# Patient Record
Sex: FEMALE | Race: WHITE | NOT HISPANIC OR LATINO | Employment: OTHER | URBAN - METROPOLITAN AREA
[De-identification: names, ages, dates, MRNs, and addresses within clinical notes are randomized per-mention and may not be internally consistent; named-entity substitution may affect disease eponyms.]

---

## 2018-09-24 ENCOUNTER — TRANSCRIBE ORDERS (OUTPATIENT)
Dept: ADMINISTRATIVE | Facility: HOSPITAL | Age: 63
End: 2018-09-24

## 2018-09-24 DIAGNOSIS — K74.69 OTHER CIRRHOSIS OF LIVER (HCC): Primary | ICD-10-CM

## 2018-10-04 ENCOUNTER — HOSPITAL ENCOUNTER (OUTPATIENT)
Dept: CT IMAGING | Facility: HOSPITAL | Age: 63
Discharge: HOME | End: 2018-10-04
Admitting: INTERNAL MEDICINE

## 2018-10-04 VITALS
BODY MASS INDEX: 25.74 KG/M2 | OXYGEN SATURATION: 95 % | HEIGHT: 67 IN | RESPIRATION RATE: 18 BRPM | SYSTOLIC BLOOD PRESSURE: 115 MMHG | TEMPERATURE: 98.3 F | WEIGHT: 164 LBS | DIASTOLIC BLOOD PRESSURE: 73 MMHG | HEART RATE: 77 BPM

## 2018-10-04 DIAGNOSIS — K74.69 OTHER CIRRHOSIS OF LIVER (HCC): ICD-10-CM

## 2018-10-04 LAB
APTT PPP: 24.8 SECONDS (ref 24–31)
GLUCOSE BLDC GLUCOMTR-MCNC: 155 MG/DL (ref 70–130)
INR PPP: 1.13 (ref 0.91–1.09)
PLATELET # BLD AUTO: 92 10*3/MM3 (ref 150–450)
PROTHROMBIN TIME: 11.9 SECONDS (ref 9.6–11.5)

## 2018-10-04 PROCEDURE — 85049 AUTOMATED PLATELET COUNT: CPT | Performed by: INTERNAL MEDICINE

## 2018-10-04 PROCEDURE — 88307 TISSUE EXAM BY PATHOLOGIST: CPT | Performed by: INTERNAL MEDICINE

## 2018-10-04 PROCEDURE — 85730 THROMBOPLASTIN TIME PARTIAL: CPT | Performed by: INTERNAL MEDICINE

## 2018-10-04 PROCEDURE — 85610 PROTHROMBIN TIME: CPT | Performed by: INTERNAL MEDICINE

## 2018-10-04 PROCEDURE — 77012 CT SCAN FOR NEEDLE BIOPSY: CPT

## 2018-10-04 PROCEDURE — 82962 GLUCOSE BLOOD TEST: CPT

## 2018-10-04 PROCEDURE — 88313 SPECIAL STAINS GROUP 2: CPT | Performed by: INTERNAL MEDICINE

## 2018-10-04 RX ORDER — LIDOCAINE HYDROCHLORIDE 10 MG/ML
20 INJECTION, SOLUTION INFILTRATION; PERINEURAL ONCE
Status: COMPLETED | OUTPATIENT
Start: 2018-10-04 | End: 2018-10-04

## 2018-10-04 RX ORDER — CILOSTAZOL 50 MG/1
50 TABLET ORAL
COMMUNITY

## 2018-10-04 RX ORDER — SODIUM CHLORIDE 0.9 % (FLUSH) 0.9 %
3 SYRINGE (ML) INJECTION EVERY 12 HOURS SCHEDULED
Status: DISCONTINUED | OUTPATIENT
Start: 2018-10-04 | End: 2018-10-05 | Stop reason: HOSPADM

## 2018-10-04 RX ORDER — FERROUS SULFATE TAB EC 324 MG (65 MG FE EQUIVALENT) 324 (65 FE) MG
324 TABLET DELAYED RESPONSE ORAL
COMMUNITY

## 2018-10-04 RX ORDER — METOPROLOL SUCCINATE 200 MG/1
50 TABLET, EXTENDED RELEASE ORAL DAILY
COMMUNITY

## 2018-10-04 RX ORDER — ATORVASTATIN CALCIUM 80 MG/1
40 TABLET, FILM COATED ORAL DAILY
COMMUNITY

## 2018-10-04 RX ORDER — CLOPIDOGREL BISULFATE 75 MG/1
75 TABLET ORAL
COMMUNITY

## 2018-10-04 RX ORDER — ESCITALOPRAM OXALATE 10 MG/1
10 TABLET ORAL
COMMUNITY

## 2018-10-04 RX ORDER — COLESEVELAM 180 1/1
625 TABLET ORAL 2 TIMES DAILY
COMMUNITY

## 2018-10-04 RX ORDER — SODIUM CHLORIDE 0.9 % (FLUSH) 0.9 %
3-10 SYRINGE (ML) INJECTION AS NEEDED
Status: DISCONTINUED | OUTPATIENT
Start: 2018-10-04 | End: 2018-10-05 | Stop reason: HOSPADM

## 2018-10-04 RX ORDER — LOPERAMIDE HYDROCHLORIDE 2 MG/1
2 CAPSULE ORAL
COMMUNITY

## 2018-10-04 RX ORDER — PANTOPRAZOLE SODIUM 40 MG/1
40 TABLET, DELAYED RELEASE ORAL
COMMUNITY
Start: 2018-04-04

## 2018-10-04 RX ORDER — DILTIAZEM HYDROCHLORIDE 120 MG/1
120 CAPSULE, COATED, EXTENDED RELEASE ORAL
COMMUNITY
Start: 2018-04-04

## 2018-10-04 RX ADMIN — LIDOCAINE HYDROCHLORIDE 20 ML: 10 INJECTION, SOLUTION INFILTRATION; PERINEURAL at 11:45

## 2018-10-04 NOTE — NURSING NOTE
Given printed and oral discharge instructions. Verbalizes understanding. Discharged per wheelchair to front entrance with daughter driving.

## 2018-10-04 NOTE — DISCHARGE INSTR - ACTIVITY
Rest quietly at home today.  You may resume light activity tomorrow as tolerated.  You may shower and remove the bandage tomorrow.  No tub baths or swimming for 5 days.

## 2018-10-04 NOTE — POST-PROCEDURE NOTE
Radiology Procedure    Pre-procedure: Informed written consent was obtained prior to beginning.  We discussed the procedure in detail and major risks of bleeding, infection, needle damage to bowel or other organs, and how bleeding is managed, if it occurs.  She agrees to proceed with CT guided random liver biopsy.       Procedure Performed: CT guided random right lobe liver biopsy.     IV Sedation and/or Anesthesia:  No    Complications: None.    Preliminary Findings: 3 intact pinkish 18 gauge cores in formalin to lab    Specimen Removed: As above.    Estimated Blood Loss:  0.1  ml    Post-Procedure Diagnosis: Non-alcoholic cirrhosis    Post-Procedure Plan: She has no c/o.  Observe in room about 2 1/2 hours and d/c.      Standard Discharge Instructions Given:yes     Andrés Jane MD  10/04/18  2:39 PM

## 2018-10-04 NOTE — NURSING NOTE
Procedure complete.  Pt tolerated well.  Report called to CAROL.  Pt returned to room via hospital transport.

## 2018-10-04 NOTE — NURSING NOTE
Returned to room. Tolerated procedure well. Folded 4x4 dressing lateral RUQ clean, dry, and intact. No complaints offered. HOB elevated, provided lunch tray.

## 2018-10-05 ENCOUNTER — TELEPHONE (OUTPATIENT)
Dept: INFUSION THERAPY | Facility: HOSPITAL | Age: 63
End: 2018-10-05

## 2018-10-05 LAB
CYTO UR: NORMAL
LAB AP CASE REPORT: NORMAL
LAB AP CLINICAL INFORMATION: NORMAL
PATH REPORT.FINAL DX SPEC: NORMAL
PATH REPORT.GROSS SPEC: NORMAL

## 2019-12-18 ENCOUNTER — DOCUMENTATION (OUTPATIENT)
Dept: ONCOLOGY | Facility: CLINIC | Age: 64
End: 2019-12-18

## 2019-12-18 NOTE — PROGRESS NOTES
[facility] called and said that path slides were ready for pickup. I called [facility] and spoke with Omid to let them know that path slides were ready for pickup. SHREE

## 2019-12-18 NOTE — PROGRESS NOTES
Sent fax request to [facility] and to [facility] for request of path slides and disc per Dr. Smith's request. AG

## 2019-12-23 ENCOUNTER — LAB REQUISITION (OUTPATIENT)
Dept: LAB | Facility: HOSPITAL | Age: 64
End: 2019-12-23

## 2019-12-23 DIAGNOSIS — K63.9 DISEASE OF INTESTINE, UNSPECIFIED: ICD-10-CM

## 2019-12-23 DIAGNOSIS — Z91.81 HISTORY OF FALLING: ICD-10-CM

## 2019-12-23 PROCEDURE — 88321 CONSLTJ&REPRT SLD PREP ELSWR: CPT | Performed by: COLON & RECTAL SURGERY

## 2019-12-26 LAB
CYTO UR: NORMAL
DX PRELIMINARY: NORMAL
LAB AP CASE REPORT: NORMAL
LAB AP DIAGNOSIS COMMENT: NORMAL
PATH REPORT.FINAL DX SPEC: NORMAL
PATH REPORT.GROSS SPEC: NORMAL

## 2019-12-30 ENCOUNTER — LAB (OUTPATIENT)
Dept: LAB | Facility: HOSPITAL | Age: 64
End: 2019-12-30

## 2019-12-30 ENCOUNTER — HOSPITAL ENCOUNTER (EMERGENCY)
Facility: HOSPITAL | Age: 64
Discharge: HOME | End: 2019-12-30
Attending: EMERGENCY MEDICINE | Admitting: EMERGENCY MEDICINE

## 2019-12-30 ENCOUNTER — APPOINTMENT (OUTPATIENT)
Dept: GENERAL RADIOLOGY | Facility: HOSPITAL | Age: 64
End: 2019-12-30

## 2019-12-30 ENCOUNTER — CONSULT (OUTPATIENT)
Dept: ONCOLOGY | Facility: CLINIC | Age: 64
End: 2019-12-30

## 2019-12-30 VITALS
OXYGEN SATURATION: 93 % | HEIGHT: 67 IN | WEIGHT: 193 LBS | HEART RATE: 81 BPM | RESPIRATION RATE: 16 BRPM | DIASTOLIC BLOOD PRESSURE: 89 MMHG | SYSTOLIC BLOOD PRESSURE: 164 MMHG | BODY MASS INDEX: 30.29 KG/M2 | TEMPERATURE: 98.8 F

## 2019-12-30 VITALS
DIASTOLIC BLOOD PRESSURE: 66 MMHG | HEART RATE: 74 BPM | RESPIRATION RATE: 16 BRPM | OXYGEN SATURATION: 90 % | BODY MASS INDEX: 30.29 KG/M2 | SYSTOLIC BLOOD PRESSURE: 120 MMHG | WEIGHT: 193 LBS | TEMPERATURE: 98.4 F | HEIGHT: 67 IN

## 2019-12-30 DIAGNOSIS — S62.102A CLOSED FRACTURE OF LEFT WRIST, INITIAL ENCOUNTER: Primary | ICD-10-CM

## 2019-12-30 DIAGNOSIS — C83.39 DIFFUSE LARGE B-CELL LYMPHOMA OF EXTRANODAL SITE EXCLUDING SPLEEN AND OTHER SOLID ORGANS (HCC): ICD-10-CM

## 2019-12-30 DIAGNOSIS — C83.39 DIFFUSE LARGE B-CELL LYMPHOMA OF EXTRANODAL SITE EXCLUDING SPLEEN AND OTHER SOLID ORGANS (HCC): Primary | ICD-10-CM

## 2019-12-30 LAB
ALBUMIN SERPL-MCNC: 3.5 G/DL (ref 3.5–5.2)
ALBUMIN/GLOB SERPL: 1 G/DL
ALP SERPL-CCNC: 173 U/L (ref 39–117)
ALT SERPL W P-5'-P-CCNC: 22 U/L (ref 1–33)
ANION GAP SERPL CALCULATED.3IONS-SCNC: 14 MMOL/L (ref 5–15)
AST SERPL-CCNC: 36 U/L (ref 1–32)
BILIRUB SERPL-MCNC: 0.6 MG/DL (ref 0.2–1.2)
BUN BLD-MCNC: 17 MG/DL (ref 8–23)
BUN/CREAT SERPL: 16.5 (ref 7–25)
CALCIUM SPEC-SCNC: 9.2 MG/DL (ref 8.6–10.5)
CHLORIDE SERPL-SCNC: 99 MMOL/L (ref 98–107)
CO2 SERPL-SCNC: 25 MMOL/L (ref 22–29)
CREAT BLD-MCNC: 1.03 MG/DL (ref 0.57–1)
ERYTHROCYTE [DISTWIDTH] IN BLOOD BY AUTOMATED COUNT: 15.1 % (ref 12.3–15.4)
GFR SERPL CREATININE-BSD FRML MDRD: 54 ML/MIN/1.73
GLOBULIN UR ELPH-MCNC: 3.5 GM/DL
GLUCOSE BLD-MCNC: 253 MG/DL (ref 65–99)
HCT VFR BLD AUTO: 36.7 % (ref 34–46.6)
HGB BLD-MCNC: 11.8 G/DL (ref 12–15.9)
LDH SERPL-CCNC: 212 U/L (ref 135–214)
LYMPHOCYTES # BLD AUTO: 1.5 10*3/MM3 (ref 0.7–3.1)
LYMPHOCYTES NFR BLD AUTO: 24.2 % (ref 19.6–45.3)
MCH RBC QN AUTO: 29.8 PG (ref 26.6–33)
MCHC RBC AUTO-ENTMCNC: 32.1 G/DL (ref 31.5–35.7)
MCV RBC AUTO: 92.6 FL (ref 79–97)
MONOCYTES # BLD AUTO: 0.5 10*3/MM3 (ref 0.1–0.9)
MONOCYTES NFR BLD AUTO: 7.8 % (ref 5–12)
NEUTROPHILS # BLD AUTO: 4.3 10*3/MM3 (ref 1.7–7)
NEUTROPHILS NFR BLD AUTO: 68 % (ref 42.7–76)
PLATELET # BLD AUTO: 81 10*3/MM3 (ref 140–450)
PMV BLD AUTO: 8.8 FL (ref 6–12)
POTASSIUM BLD-SCNC: 4.1 MMOL/L (ref 3.5–5.2)
PROT SERPL-MCNC: 7 G/DL (ref 6–8.5)
RBC # BLD AUTO: 3.96 10*6/MM3 (ref 3.77–5.28)
SODIUM BLD-SCNC: 138 MMOL/L (ref 136–145)
WBC NRBC COR # BLD: 6.3 10*3/MM3 (ref 3.4–10.8)

## 2019-12-30 PROCEDURE — 25010000002 PROPOFOL 10 MG/ML EMULSION: Performed by: EMERGENCY MEDICINE

## 2019-12-30 PROCEDURE — 83615 LACTATE (LD) (LDH) ENZYME: CPT

## 2019-12-30 PROCEDURE — 85025 COMPLETE CBC W/AUTO DIFF WBC: CPT

## 2019-12-30 PROCEDURE — 99152 MOD SED SAME PHYS/QHP 5/>YRS: CPT

## 2019-12-30 PROCEDURE — 99283 EMERGENCY DEPT VISIT LOW MDM: CPT

## 2019-12-30 PROCEDURE — 99205 OFFICE O/P NEW HI 60 MIN: CPT | Performed by: INTERNAL MEDICINE

## 2019-12-30 PROCEDURE — 73110 X-RAY EXAM OF WRIST: CPT

## 2019-12-30 PROCEDURE — 80053 COMPREHEN METABOLIC PANEL: CPT

## 2019-12-30 PROCEDURE — 99284 EMERGENCY DEPT VISIT MOD MDM: CPT

## 2019-12-30 PROCEDURE — 36415 COLL VENOUS BLD VENIPUNCTURE: CPT

## 2019-12-30 RX ORDER — PROPOFOL 10 MG/ML
VIAL (ML) INTRAVENOUS
Status: COMPLETED | OUTPATIENT
Start: 2019-12-30 | End: 2019-12-30

## 2019-12-30 RX ORDER — PROPOFOL 10 MG/ML
200 VIAL (ML) INTRAVENOUS ONCE
Status: DISCONTINUED | OUTPATIENT
Start: 2019-12-30 | End: 2019-12-30 | Stop reason: HOSPADM

## 2019-12-30 RX ORDER — HYDROCODONE BITARTRATE AND ACETAMINOPHEN 5; 325 MG/1; MG/1
1 TABLET ORAL EVERY 6 HOURS PRN
Qty: 20 TABLET | Refills: 0 | Status: SHIPPED | OUTPATIENT
Start: 2019-12-30

## 2019-12-30 RX ADMIN — PROPOFOL 40 MG: 10 INJECTION, EMULSION INTRAVENOUS at 19:26

## 2019-12-30 RX ADMIN — PROPOFOL 60 MG: 10 INJECTION, EMULSION INTRAVENOUS at 19:19

## 2019-12-30 NOTE — PROGRESS NOTES
CHIEF COMPLAINT: Bowel lymphoma    REASON FOR REFERRAL: Same      RECORDS OBTAINED  Records of the patients history including those obtained from Dr. Martell were reviewed and summarized in detail.    Oncology/Hematology History    1. Nonalcoholic cirrhosis  2. Portal hypertensive gastropathy and encephalopathy  3. Iron deficiency anemia by history from problem #2  4. 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 MI status post stent  6. Hypertension  7. Reflux  8. Diabetes      Oncology history:  History of nonalcoholic cirrhosis meld score 7 on diuretics with stage III fibrosis grade 1 christopher-portal inflammation with 10 to 15% steatosis on prior liver biopsy with portal hypertensive gastropathy and iron deficiency anemia and history of encephalopathy, with history of MI status post stent placement, valvular heart disease, hypertension, anxiety, reflux, diabetes, dysphagia, saw Dr. Martell 9/18/2019 with CT abdomen per his report showing thickened antrum and sigmoid colon with a family history of stomach cancer in her father.  Had 8/23/2018 EGD showing gastritis and 7/13/2018 colonoscopy showing diverticular disease and hemorrhoids.  Not on anticoagulants per Dr. Martell.  On Xifaxan for encephalopathy.  On low-sodium diet.  Also had a complex renal cyst.  At the September appointment Dr. Martell's review of MRI and CT scan of August from outside hospital mentioned a 3 cm liver lesion in the dome possibly hemangioma versus portal vein thrombosis but CT scan ordered by his office 1 month later mention no liver lesion but a cystic lesion of the kidney.  He recommended follow-up CT followed by possible colonoscopy and capsule endoscopy if negative as well as ultrasound of the abdomen for surveillance for hepatocellular carcinoma.  9/11/2019 CBC had platelets of 93,000 otherwise normal hemoglobin and white count and differential.  CMP unremarkable save for AST of 45.  Creatinine elevated at 1.48.  Had an EGD 8/23/2019 showing  esophageal stricture and stenosis with balloon dilation and no varices but with portal hypertensive gastropathy to the stomach antrum.  10/4/2019 colonoscopy showed an 8 mm ulcerated submucosal lesion in the cecum with diverticulosis.  Pathology mentions the possibility of germinal center lymphoma disrupted by biopsy with a lymphoid aggregate as the normal germinal center cells look atypical.  11/19/2019 chest x-ray showed elevation of right hemidiaphragm with right basilar atelectasis chronic and no acute pulmonary changes.  Chronic thoracic deformity unchanged compared to April 2019 CT.  11/19/2019 CT chest abdomen pelvis shows cirrhotic liver with splenomegaly and partially occluded thrombus in the distal portal vein extending to the right and left portal vein branches.  There is a right renal cyst 5.3 cm.  There was mild to moderate diffuse wall thickening involving the cecum and ascending colon.  This clot was not seen on previous portal vein ultrasound in 2018.,  Saw me for the first time 12/30/2019 regarding this potential of lurking lymphoma.  We will check PET scan and if there is no adenopathy consider bone marrow biopsy and if that is unrevealing then we need to get more tissue from the bowel as this could be a reactive process at this point given that we are only dealing with immunohistochemical evidence of atypia and not clear-cut lymphoma.        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  female, referred for possible bowel lymphoma.  She has multiple other comorbidities.  This was stumbled upon and doing scanning relative to her cirrhosis and follow-up on liver abnormalities that subsequently were apparently not corroborated with serial scanning and  ultrasounds.    REVIEW OF SYSTEMS:  A 14 point review of systems was performed and is negative except as noted above.    Past Medical History:   Diagnosis Date   • Coronary artery disease    • Diabetes mellitus (CMS/HCC)    • GERD (gastroesophageal reflux disease)    • Hypertension    • Liver disease    • Sleep apnea     uses cpap prn     Past Surgical History:   Procedure Laterality Date   • CORONARY ANGIOPLASTY WITH STENT PLACEMENT      4   • TOE AMPUTATION Bilateral     great and 4th toe       Current Outpatient Medications on File Prior to Visit   Medication Sig Dispense Refill   • colesevelam (WELCHOL) 625 MG tablet Take 625 mg by mouth.     • escitalopram (LEXAPRO) 10 MG tablet Take 10 mg by mouth.     • ferrous sulfate 324 (65 Fe) MG tablet delayed-release EC tablet Take 324 mg by mouth 2 (Two) Times a Day With Meals.     • insulin detemir (LEVEMIR) 100 UNIT/ML injection Inject 55 Units under the skin into the appropriate area as directed 2 (Two) Times a Day.     • Insulin Lispro (HUMALOG) 100 UNIT/ML solution pen-injector Inject 18 Units under the skin into the appropriate area as directed 3 (Three) Times a Day.     • loperamide (IMODIUM) 2 MG capsule Take 2 mg by mouth.     • metFORMIN (GLUCOPHAGE) 1000 MG tablet Take 1,000 mg by mouth 2 (Two) Times a Day With Meals.     • metoprolol succinate XL (TOPROL-XL) 200 MG 24 hr tablet Take 50 mg by mouth Daily.     • pantoprazole (PROTONIX) 40 MG EC tablet Take 40 mg by mouth.     • rifAXIMin (XIFAXAN) 200 MG tablet Take 200 mg by mouth 2 (Two) Times a Day.     • SITagliptin (JANUVIA) 100 MG tablet Take 100 mg by mouth.     • atorvastatin (LIPITOR) 80 MG tablet Take 80 mg by mouth.     • cilostazol (PLETAL) 50 MG tablet Take 50 mg by mouth.     • clopidogrel (PLAVIX) 75 MG tablet Take 75 mg by mouth.     • diltiaZEM CD (DILTIAZEM CD) 120 MG 24 hr capsule Take 120 mg by mouth.       No current facility-administered medications on file prior to visit.        No  "Known Allergies    Social History     Socioeconomic History   • Marital status:      Spouse name: preet   • Number of children: Not on file   • Years of education: Not on file   • Highest education level: Not on file   Tobacco Use   • Smoking status: Never Smoker   • Smokeless tobacco: Never Used   Substance and Sexual Activity   • Alcohol use: No   • Drug use: No   • Sexual activity: Defer   Social History Narrative    Lives in [place] with        Family History   Problem Relation Age of Onset   • Thyroid cancer Nephew    • Breast cancer Maternal Aunt        PHYSICAL EXAM:    /66   Pulse 74   Temp 98.4 °F (36.9 °C)   Resp 16   Ht 170.2 cm (67\")   Wt 87.5 kg (193 lb)   SpO2 90%   BMI 30.23 kg/m²     ECOG: (1) Restricted in physically strenuous activity, ambulatory and able to do work of light nature  General: well appearing female in no acute distress  HEENT: sclera anicteric, oropharynx clear  Lymphatics: no cervical, supraclavicular, inguinal, or axillary adenopathy  Cardiovascular: regular rate and rhythm, no murmurs  Neck: Supple; No thyromegaly  Lungs: clear to auscultation bilaterally. No respiratory distress.   Abdomen: soft, nontender, nondistended.  No palpable organomegaly  Extremities: no cyanosis, clubbing, edema, or cords  Skin: no rashes, lesions, bruising, or petechiae  Neuro: Alert and oriented x 4; Moving all extremities.  Psych: No anxiety or depression    Lab Results   Component Value Date    PLT 92 (L) 10/04/2018     Lab Results   Component Value Date    BUN 14 04/27/2018    CREATININE 0.9 04/27/2018     04/27/2018    K 4.1 07/09/2018     04/27/2018    CO2 26 04/27/2018    CALCIUM 9.1 04/27/2018    PROTEINTOT 6.4 (L) 04/27/2018    ALBUMIN 3.5 04/27/2018    BILITOT 0.8 04/27/2018    ALKPHOS 95 04/27/2018    AST 21 04/27/2018    ALT 10 04/27/2018           Assessment/Plan     1. Nonalcoholic cirrhosis  2. Portal hypertensive gastropathy and " encephalopathy  3. Iron deficiency anemia by history from problem #2  4. 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 MI status post stent  6. Hypertension  7. Reflux  8. Diabetes      Oncology history:  History of nonalcoholic cirrhosis meld score 7 on diuretics with stage III fibrosis grade 1 christopher-portal inflammation with 10 to 15% steatosis on prior liver biopsy with portal hypertensive gastropathy and iron deficiency anemia and history of encephalopathy, with history of MI status post stent placement, valvular heart disease, hypertension, anxiety, reflux, diabetes, dysphagia, saw Dr. Martell 9/18/2019 with CT abdomen per his report showing thickened antrum and sigmoid colon with a family history of stomach cancer in her father.  Had 8/23/2018 EGD showing gastritis and 7/13/2018 colonoscopy showing diverticular disease and hemorrhoids.  Not on anticoagulants per Dr. Martell.  On Xifaxan for encephalopathy.  On low-sodium diet.  Also had a complex renal cyst.  At the September appointment Dr. Martell's review of MRI and CT scan of August from outside hospital mentioned a 3 cm liver lesion in the dome possibly hemangioma versus portal vein thrombosis but CT scan ordered by his office 1 month later mention no liver lesion but a cystic lesion of the kidney.  He recommended follow-up CT followed by possible colonoscopy and capsule endoscopy if negative as well as ultrasound of the abdomen for surveillance for hepatocellular carcinoma.  9/11/2019 CBC had platelets of 93,000 otherwise normal hemoglobin and white count and differential.  CMP unremarkable save for AST of 45.  Creatinine elevated at 1.48.  Had an EGD 8/23/2019 showing esophageal stricture and stenosis with balloon dilation and no varices but with portal hypertensive gastropathy to the stomach antrum.  10/4/2019 colonoscopy showed an 8 mm ulcerated submucosal lesion in the cecum with diverticulosis.  Pathology mentions the possibility of germinal center lymphoma  disrupted by biopsy with a lymphoid aggregate as the normal germinal center cells look atypical.  11/19/2019 chest x-ray showed elevation of right hemidiaphragm with right basilar atelectasis chronic and no acute pulmonary changes.  Chronic thoracic deformity unchanged compared to April 2019 CT.  11/19/2019 CT chest abdomen pelvis shows cirrhotic liver with splenomegaly and partially occluded thrombus in the distal portal vein extending to the right and left portal vein branches.  There is a right renal cyst 5.3 cm.  There was mild to moderate diffuse wall thickening involving the cecum and ascending colon.  This clot was not seen on previous portal vein ultrasound in 2018.,  Saw me for the first time 12/30/2019 regarding this potential of lurking lymphoma.  We will check PET scan and if there is no adenopathy consider bone marrow biopsy and if that is unrevealing then we need to get more tissue from the bowel as this could be a reactive process at this point given that we are only dealing with immunohistochemical evidence of atypia and not clear-cut lymphoma.  She does have some ascites and surgical intervention does have risk obviously and would strictly be diagnostic in the case of lymphoma.    I discussed with patient face-to-face regarding this plan for 1 hour greater than 50% spent counseling regarding this algorithm.  Eric Jaffe MD    12/30/2019

## 2020-01-13 ENCOUNTER — HOSPITAL ENCOUNTER (OUTPATIENT)
Dept: PET IMAGING | Facility: HOSPITAL | Age: 65
Discharge: HOME | End: 2020-01-13
Admitting: INTERNAL MEDICINE

## 2020-01-13 ENCOUNTER — APPOINTMENT (OUTPATIENT)
Dept: PET IMAGING | Facility: HOSPITAL | Age: 65
End: 2020-01-13

## 2020-01-13 ENCOUNTER — HOSPITAL ENCOUNTER (OUTPATIENT)
Dept: PET IMAGING | Facility: HOSPITAL | Age: 65
Discharge: HOME | End: 2020-01-13

## 2020-01-13 ENCOUNTER — OFFICE VISIT (OUTPATIENT)
Dept: ONCOLOGY | Facility: CLINIC | Age: 65
End: 2020-01-13

## 2020-01-13 VITALS
OXYGEN SATURATION: 93 % | WEIGHT: 198 LBS | BODY MASS INDEX: 31.08 KG/M2 | HEIGHT: 67 IN | SYSTOLIC BLOOD PRESSURE: 119 MMHG | HEART RATE: 76 BPM | TEMPERATURE: 98.4 F | DIASTOLIC BLOOD PRESSURE: 66 MMHG | RESPIRATION RATE: 16 BRPM

## 2020-01-13 DIAGNOSIS — C83.39 DIFFUSE LARGE B-CELL LYMPHOMA OF EXTRANODAL SITE EXCLUDING SPLEEN AND OTHER SOLID ORGANS (HCC): ICD-10-CM

## 2020-01-13 DIAGNOSIS — C83.39 DIFFUSE LARGE B-CELL LYMPHOMA OF EXTRANODAL SITE EXCLUDING SPLEEN AND OTHER SOLID ORGANS (HCC): Primary | ICD-10-CM

## 2020-01-13 LAB — GLUCOSE BLDC GLUCOMTR-MCNC: 186 MG/DL (ref 70–130)

## 2020-01-13 PROCEDURE — 99214 OFFICE O/P EST MOD 30 MIN: CPT | Performed by: INTERNAL MEDICINE

## 2020-01-13 PROCEDURE — 82962 GLUCOSE BLOOD TEST: CPT

## 2020-01-13 PROCEDURE — 0 FLUDEOXYGLUCOSE F18 SOLUTION: Performed by: INTERNAL MEDICINE

## 2020-01-13 PROCEDURE — 78815 PET IMAGE W/CT SKULL-THIGH: CPT

## 2020-01-13 PROCEDURE — A9552 F18 FDG: HCPCS | Performed by: INTERNAL MEDICINE

## 2020-01-13 RX ORDER — FUROSEMIDE 40 MG/1
40 TABLET ORAL DAILY PRN
COMMUNITY

## 2020-01-13 RX ORDER — ASPIRIN 81 MG/1
81 TABLET ORAL DAILY
COMMUNITY

## 2020-01-13 RX ORDER — LANOLIN ALCOHOL/MO/W.PET/CERES
1000 CREAM (GRAM) TOPICAL DAILY
COMMUNITY

## 2020-01-13 RX ADMIN — FLUDEOXYGLUCOSE F18 1 DOSE: 300 INJECTION INTRAVENOUS at 10:59

## 2020-01-13 NOTE — PROGRESS NOTES
CHIEF COMPLAINT: Possible lymphoma of bowel.    Problem List:  Oncology/Hematology History    1. Nonalcoholic cirrhosis  2. Portal hypertensive gastropathy and encephalopathy  3. Iron deficiency anemia by history from problem #2  4. 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 MI status post stent  6. Hypertension  7. Reflux  8. Diabetes      Oncology history:  History of nonalcoholic cirrhosis meld score 7 on diuretics with stage III fibrosis grade 1 christopher-portal inflammation with 10 to 15% steatosis on prior liver biopsy with portal hypertensive gastropathy and iron deficiency anemia and history of encephalopathy, with history of MI status post stent placement, valvular heart disease, hypertension, anxiety, reflux, diabetes, dysphagia, saw Dr. Martell 9/18/2019 with CT abdomen per his report showing thickened antrum and sigmoid colon with a family history of stomach cancer in her father.  Had 8/23/2018 EGD showing gastritis and 7/13/2018 colonoscopy showing diverticular disease and hemorrhoids.  Not on anticoagulants per Dr. Martell.  On Xifaxan for encephalopathy.  On low-sodium diet.  Also had a complex renal cyst.  At the September appointment Dr. Martell's review of MRI and CT scan of August from outside hospital mentioned a 3 cm liver lesion in the dome possibly hemangioma versus portal vein thrombosis but CT scan ordered by his office 1 month later mention no liver lesion but a cystic lesion of the kidney.  He recommended follow-up CT followed by possible colonoscopy and capsule endoscopy if negative as well as ultrasound of the abdomen for surveillance for hepatocellular carcinoma.  9/11/2019 CBC had platelets of 93,000 otherwise normal hemoglobin and white count and differential.  CMP unremarkable save for AST of 45.  Creatinine elevated at 1.48.  Had an EGD 8/23/2019 showing esophageal stricture and stenosis with balloon dilation and no varices but with portal hypertensive gastropathy to the stomach antrum.   10/4/2019 colonoscopy showed an 8 mm ulcerated submucosal lesion in the cecum with diverticulosis.  Pathology mentions the possibility of germinal center lymphoma disrupted by biopsy with a lymphoid aggregate as the normal germinal center cells look atypical.  11/19/2019 chest x-ray showed elevation of right hemidiaphragm with right basilar atelectasis chronic and no acute pulmonary changes.  Chronic thoracic deformity unchanged compared to April 2019 CT.  11/19/2019 CT chest abdomen pelvis shows cirrhotic liver with splenomegaly and partially occluded thrombus in the distal portal vein extending to the right and left portal vein branches.  There is a right renal cyst 5.3 cm.  There was mild to moderate diffuse wall thickening involving the cecum and ascending colon.  This clot was not seen on previous portal vein ultrasound in 2018.,  Saw me for the first time 12/30/2019 regarding this potential of lurking lymphoma.      1/13/2020 PET scan negative.  Per discussion at 1/10/2020 multidisciplinary tumor board, recommendation was to repeat endoscopy for additional tissue diagnosis as we are unlikely to get a tissue diagnosis from a bone marrow biopsy.  She is chronically mildly anemic and thrombocytopenic from her portal hypertension.          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 female, here for follow up on management of possible bowel lymphoma in the face of portal hypertension.      Past Medical History:   Diagnosis Date   • Coronary artery disease    • Diabetes mellitus (CMS/HCC)    • GERD (gastroesophageal reflux disease)    • Hypertension    • Liver disease    • Sleep apnea     uses cpap prn     Past Surgical History:   Procedure Laterality Date   •  "CORONARY ANGIOPLASTY WITH STENT PLACEMENT      6   • TOE AMPUTATION Bilateral     great and 4th toe   • WRIST SURGERY Left 01/10/2020       No Known Allergies    Family History and Social History reviewed and changed as necessary      REVIEW OF SYSTEM:   Review of Systems   Constitutional: Negative for appetite change, chills, diaphoresis, fatigue, fever and unexpected weight change.   HENT:   Negative for mouth sores, sore throat and trouble swallowing.    Eyes: Negative for icterus.   Respiratory: Negative for cough, hemoptysis and shortness of breath.    Cardiovascular: Negative for chest pain, leg swelling and palpitations.   Gastrointestinal: Negative for abdominal distention, abdominal pain, blood in stool, constipation, diarrhea, nausea and vomiting.   Endocrine: Negative for hot flashes.   Genitourinary: Negative for bladder incontinence, difficulty urinating, dysuria, frequency and hematuria.    Musculoskeletal: Negative for gait problem, neck pain and neck stiffness.   Skin: Negative for rash.   Neurological: Negative for dizziness, gait problem, headaches, light-headedness and numbness.   Hematological: Negative for adenopathy. Does not bruise/bleed easily.   Psychiatric/Behavioral: Negative for depression. The patient is not nervous/anxious.    All other systems reviewed and are negative.       PHYSICAL EXAM    Vitals:    01/13/20 1431   BP: 119/66   Pulse: 76   Resp: 16   Temp: 98.4 °F (36.9 °C)   SpO2: 93%   Weight: 89.8 kg (198 lb)   Height: 170.2 cm (67\")     Constitutional: Appears well-developed and well-nourished. No distress.   ECOG: (1) Restricted in physically strenuous activity, ambulatory and able to do work of light nature  HENT:   Head: Normocephalic.   Mouth/Throat: Oropharynx is clear and moist.   Eyes: Conjunctivae are normal. Pupils are equal, round, and reactive to light. No scleral icterus.   Neck: Neck supple. No JVD present. No thyromegaly present.   Cardiovascular: Normal rate, " regular rhythm and normal heart sounds.    Pulmonary/Chest: Breath sounds normal. No respiratory distress.   Abdominal: Soft. Exhibits no distension and no mass. There is no hepatosplenomegaly. There is no tenderness. There is no rebound and no guarding.   Musculoskeletal:Exhibits no edema, tenderness or deformity.   Neurological: Alert and oriented to person, place, and time. Exhibits normal muscle tone.   Skin: No ecchymosis, no petechiae and no rash noted. Not diaphoretic. No cyanosis. Nails show no clubbing.   Psychiatric: Normal mood and affect.   Vitals reviewed.      Lab Results   Component Value Date    HGB 11.8 (L) 12/30/2019    HCT 36.7 12/30/2019    MCV 92.6 12/30/2019    PLT 81 (L) 12/30/2019    WBC 6.30 12/30/2019    NEUTROABS 4.30 12/30/2019    LYMPHSABS 1.50 12/30/2019    MONOSABS 0.50 12/30/2019       Lab Results   Component Value Date    GLUCOSE 253 (H) 12/30/2019    BUN 17 12/30/2019    CREATININE 1.03 (H) 12/30/2019     12/30/2019    K 4.1 12/30/2019    CL 99 12/30/2019    CO2 25.0 12/30/2019    CALCIUM 9.2 12/30/2019    PROTEINTOT 7.0 12/30/2019    ALBUMIN 3.50 12/30/2019    BILITOT 0.6 12/30/2019    ALKPHOS 173 (H) 12/30/2019    AST 36 (H) 12/30/2019    ALT 22 12/30/2019                   ASSESSMENT & PLAN:  1. Nonalcoholic cirrhosis  2. Portal hypertensive gastropathy and encephalopathy  3. Iron deficiency anemia by history from problem #2  4. 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 MI status post stent  6. Hypertension  7. Reflux  8. Diabetes    Discussion: We presented her at our multidisciplinary tumor board.  In the absence of anything on PET to go after I think with her current blood counts as above I doubt there would be much yield from a bone marrow biopsy and we will get her back to Dr. Martell with whom I have left a message for further endoscopic biopsy and evaluation.  The pathologic specimen was greatly degraded and a lot of crush artifact and hard to interpret.  Discussed with  patient face-to-face 30 minutes greater than 50% spent counseling regarding this plan as outlined above.  I will see her back in about a month to go over repeat endoscopic evaluation.           Eric Jaffe MD    01/13/2020